# Patient Record
Sex: FEMALE | Race: WHITE | NOT HISPANIC OR LATINO | URBAN - METROPOLITAN AREA
[De-identification: names, ages, dates, MRNs, and addresses within clinical notes are randomized per-mention and may not be internally consistent; named-entity substitution may affect disease eponyms.]

---

## 2018-02-04 ENCOUNTER — EMERGENCY (EMERGENCY)
Facility: HOSPITAL | Age: 33
LOS: 1 days | Discharge: HOME | End: 2018-02-04
Admitting: EMERGENCY MEDICINE
Payer: MEDICAID

## 2018-02-04 VITALS — RESPIRATION RATE: 18 BRPM | WEIGHT: 110.01 LBS | OXYGEN SATURATION: 100 % | HEART RATE: 81 BPM

## 2018-02-04 DIAGNOSIS — F12.920 CANNABIS USE, UNSPECIFIED WITH INTOXICATION, UNCOMPLICATED: ICD-10-CM

## 2018-02-04 DIAGNOSIS — F17.200 NICOTINE DEPENDENCE, UNSPECIFIED, UNCOMPLICATED: ICD-10-CM

## 2018-02-04 PROCEDURE — 99284 EMERGENCY DEPT VISIT MOD MDM: CPT | Mod: 25

## 2018-02-04 RX ORDER — ONDANSETRON 8 MG/1
4 TABLET, FILM COATED ORAL ONCE
Qty: 0 | Refills: 0 | Status: COMPLETED | OUTPATIENT
Start: 2018-02-04 | End: 2018-02-04

## 2018-02-04 RX ORDER — ALPRAZOLAM 0.25 MG
2 TABLET ORAL ONCE
Qty: 0 | Refills: 0 | Status: DISCONTINUED | OUTPATIENT
Start: 2018-02-04 | End: 2018-02-04

## 2018-02-04 RX ADMIN — Medication 1 MILLIGRAM(S): at 22:50

## 2018-02-04 RX ADMIN — ONDANSETRON 4 MILLIGRAM(S): 8 TABLET, FILM COATED ORAL at 23:01

## 2018-02-04 NOTE — ED ADULT TRIAGE NOTE - CHIEF COMPLAINT QUOTE
Pt presents to ED with c/o anxiety and panic attack s/p eating a marijuana brownie. Pt throwing herself on the floor during triage, hx limited 2/2 patient cooperation.

## 2018-02-04 NOTE — ED PROVIDER NOTE - PHYSICAL EXAMINATION
General: lethargic, arousable to touch, moaning   Head: NCAT  Eyes: PERRL  Heart: RRR  Lungs: CTAB  Abd: soft, NTND  Neuro: moves all 4 extremities equally

## 2018-02-04 NOTE — ED PROVIDER NOTE - OBJECTIVE STATEMENT
31 y/o F    no pmhx    Pt BIBA after eating an entire marijuana brownie. Stating she feels like her heart is going to explode- throwing herself on ground in triage. Endorsing nausea. Unable to obtain rest of Denies fevers, chills, nausea, vomiting, diarrhea, constipation, abdominal pain, urinary symptoms, chest pain, palpitations, shortness of breath, dyspnea on exertion, syncope/near syncope, cough/URI symptoms, headache, weakness, numbness, focal deficits, visual changes, gait or balance changes, dizziness?HPI 2/2 intoxication. Deneis alcohol use or any other drug use.

## 2018-02-04 NOTE — ED PROVIDER NOTE - PROGRESS NOTE DETAILS
TRISTEN Smalls received patient at shift change pending sobriety patient awake, alert. feels well.   At the time of discharge from the Emergency Department, the patient is alert with fluent appropriate speech and ambulatory without difficulty. A complete medical screening examination was performed and no emergency medical condition was identified.

## 2018-02-05 VITALS
OXYGEN SATURATION: 95 % | HEART RATE: 110 BPM | DIASTOLIC BLOOD PRESSURE: 65 MMHG | SYSTOLIC BLOOD PRESSURE: 109 MMHG | TEMPERATURE: 98 F | RESPIRATION RATE: 19 BRPM

## 2020-04-29 PROBLEM — Z00.00 ENCOUNTER FOR PREVENTIVE HEALTH EXAMINATION: Status: ACTIVE | Noted: 2020-04-29

## 2020-05-18 ENCOUNTER — APPOINTMENT (OUTPATIENT)
Dept: NEUROLOGY | Facility: CLINIC | Age: 35
End: 2020-05-18

## 2022-09-29 ENCOUNTER — APPOINTMENT (OUTPATIENT)
Dept: UROGYNECOLOGY | Facility: CLINIC | Age: 37
End: 2022-09-29

## 2022-10-06 ENCOUNTER — APPOINTMENT (OUTPATIENT)
Dept: UROLOGY | Facility: CLINIC | Age: 37
End: 2022-10-06

## 2022-10-11 ENCOUNTER — APPOINTMENT (OUTPATIENT)
Dept: UROGYNECOLOGY | Facility: CLINIC | Age: 37
End: 2022-10-11

## 2023-04-27 ENCOUNTER — APPOINTMENT (OUTPATIENT)
Dept: ORTHOPEDIC SURGERY | Facility: CLINIC | Age: 38
End: 2023-04-27

## 2023-05-03 ENCOUNTER — RESULT REVIEW (OUTPATIENT)
Age: 38
End: 2023-05-03

## 2023-05-03 ENCOUNTER — OUTPATIENT (OUTPATIENT)
Dept: OUTPATIENT SERVICES | Facility: HOSPITAL | Age: 38
LOS: 1 days | End: 2023-05-03
Payer: COMMERCIAL

## 2023-05-03 ENCOUNTER — APPOINTMENT (OUTPATIENT)
Dept: ORTHOPEDIC SURGERY | Facility: CLINIC | Age: 38
End: 2023-05-03
Payer: COMMERCIAL

## 2023-05-03 VITALS
HEIGHT: 64 IN | WEIGHT: 106 LBS | OXYGEN SATURATION: 97 % | DIASTOLIC BLOOD PRESSURE: 77 MMHG | SYSTOLIC BLOOD PRESSURE: 118 MMHG | BODY MASS INDEX: 18.1 KG/M2 | HEART RATE: 73 BPM

## 2023-05-03 DIAGNOSIS — M25.811 OTHER SPECIFIED JOINT DISORDERS, RIGHT SHOULDER: ICD-10-CM

## 2023-05-03 DIAGNOSIS — Z78.9 OTHER SPECIFIED HEALTH STATUS: ICD-10-CM

## 2023-05-03 DIAGNOSIS — M79.18 MYALGIA, OTHER SITE: ICD-10-CM

## 2023-05-03 DIAGNOSIS — M54.2 CERVICALGIA: ICD-10-CM

## 2023-05-03 PROCEDURE — 99203 OFFICE O/P NEW LOW 30 MIN: CPT

## 2023-05-03 PROCEDURE — 73030 X-RAY EXAM OF SHOULDER: CPT

## 2023-05-03 PROCEDURE — 73030 X-RAY EXAM OF SHOULDER: CPT | Mod: 26,RT

## 2023-05-03 PROCEDURE — 72050 X-RAY EXAM NECK SPINE 4/5VWS: CPT

## 2023-05-03 PROCEDURE — 72050 X-RAY EXAM NECK SPINE 4/5VWS: CPT | Mod: 26

## 2023-05-03 RX ORDER — ETODOLAC 400 MG/1
400 TABLET, FILM COATED ORAL TWICE DAILY
Qty: 30 | Refills: 1 | Status: ACTIVE | COMMUNITY
Start: 2023-05-03 | End: 1900-01-01

## 2023-05-03 RX ORDER — AMITRIPTYLINE HYDROCHLORIDE 75 MG/1
TABLET, FILM COATED ORAL
Refills: 0 | Status: ACTIVE | COMMUNITY

## 2023-05-03 RX ORDER — CLONAZEPAM 1 MG/1
1 TABLET ORAL
Refills: 0 | Status: ACTIVE | COMMUNITY

## 2023-05-07 NOTE — PHYSICAL EXAM
[de-identified] : General: Well-nourished, well-developed, alert, and in no acute distress.\par Head: Normocephalic.\par Eyes: Pupils equal, extraocular muscles intact, normal sclera.\par Nose: No nasal discharge.\par Cardiovascular: Extremities are warm and well perfused. Distal pulses are symmetric bilaterally.\par Respiratory: No labored breathing.\par Extremities: Sensation is intact distally bilaterally. Distal pulses are symmetric bilaterally\par Lymphatic: No regional lymphadenopathy, no lymphedema\par Neurologic: No focal deficits\par Skin: Normal skin color, texture, and turgor\par Psychiatric: Normal affect \par \par Examination of  shoulder shows no overlying skin changes or muscular atrophy. There is tenderness to light palpation over the AC joint, Bicipital groove, Trapezius, cervical paraspinals.\par \par Range of motion shows forward elevation of 180°, abduction 180°, external rotation 60°, and internal rotation to the mid thoracic spine.  There is no pain at the end range of motion. \par C-spine full painless AROM\par \par Special Tests: \par Spurling's Compression negative\par Neer's positive\par Hawkin's positive\par Jason's negative\par Lift-Off negative\par Elliott negative\par Speed's negative  \par Apprehension Test negative\par \par Sensation is intact to light touch over the axillary, musculocutaneous, median, radial, and ulnar nerve distributions bilaterally. Capillary refill is less than two seconds. Radial pulses 2+ equal bilaterally. Strength testing shows 5/5 abduction, 5/5 external rotation, 5/5 internal rotation, 5/5 biceps, 5/5 triceps. 5/5 wrist extension, 5/5 intrinsics. \par Reflexes 2+ biceps, brachioradialis. Arenas negative. \par  [de-identified] : XR C spine (5/3/23): There is no evidence of fracture or dislocation. There is loss of lordosis. Mild intervertebral joint space narrowing. Osteophytes at C5-6.\par \par XR right shoulder (5/3/23): There is no evidence of fracture or dislocation. The glenohumeral joint space is preserved.\par

## 2023-05-07 NOTE — HISTORY OF PRESENT ILLNESS
[de-identified] : QUENTIN HEAD is a 37 year old female who presents with right shoulder pain.\par Patient is right-hand dominant.\par States the onset of pain was 1 week ago\par Friend was chest pressing a heavy weight that fell onto him. She ran over to assist and lifted the weight and felt pop in her right shoulder. No dislocation.\par Pain is around right shoulder and radiating down arm.\par There is associated paraesthesia.\par There is no associated numbness or weakness.\par Exacerbating factors are lifting arm over head and behind back\par Does not exercise regularly. 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

## 2023-05-07 NOTE — ASSESSMENT
[FreeTextEntry1] : QUENTIN HEAD is a 37 year old female with right shoulder pain.\par I discussed with the patient that their symptoms, signs, and imaging are most consistent with shoulder impingement and myofascial pain.\par We reviewed the natural history of this condition and treatment options.\par We agreed on the following plan:\par \par \par XR C spine and right shoulder taken today.\par Start Home Exercises for cervical spine conditioning. Demonstration and handout provided. \par Physical therapy. Referral provided.\par Medication: Etodolac 400mg BID prescription provided. Muscle relaxant contraindicated as patient taking clonazepam daily.\par Apply heat.\par Discussed ergonomic work space design, routine breaks with stretching, awareness of neck posture when using mobile devices, reading etc.\par Advanced imaging: consider MRI if no symptomatic improvement. \par Follow up in 6-8 weeks.

## 2023-05-07 NOTE — HISTORY OF PRESENT ILLNESS
[de-identified] : QUENTIN HEAD is a 37 year old female who presents with right shoulder pain.\par Patient is right-hand dominant.\par States the onset of pain was 1 week ago\par Friend was chest pressing a heavy weight that fell onto him. She ran over to assist and lifted the weight and felt pop in her right shoulder. No dislocation.\par Pain is around right shoulder and radiating down arm.\par There is associated paraesthesia.\par There is no associated numbness or weakness.\par Exacerbating factors are lifting arm over head and behind back\par Does not exercise regularly. 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

## 2023-05-07 NOTE — PHYSICAL EXAM
[de-identified] : General: Well-nourished, well-developed, alert, and in no acute distress.\par Head: Normocephalic.\par Eyes: Pupils equal, extraocular muscles intact, normal sclera.\par Nose: No nasal discharge.\par Cardiovascular: Extremities are warm and well perfused. Distal pulses are symmetric bilaterally.\par Respiratory: No labored breathing.\par Extremities: Sensation is intact distally bilaterally. Distal pulses are symmetric bilaterally\par Lymphatic: No regional lymphadenopathy, no lymphedema\par Neurologic: No focal deficits\par Skin: Normal skin color, texture, and turgor\par Psychiatric: Normal affect \par \par Examination of  shoulder shows no overlying skin changes or muscular atrophy. There is tenderness to light palpation over the AC joint, Bicipital groove, Trapezius, cervical paraspinals.\par \par Range of motion shows forward elevation of 180°, abduction 180°, external rotation 60°, and internal rotation to the mid thoracic spine.  There is no pain at the end range of motion. \par C-spine full painless AROM\par \par Special Tests: \par Spurling's Compression negative\par Neer's positive\par Hawkin's positive\par Jason's negative\par Lift-Off negative\par [place] negative\par Speed's negative  \par Apprehension Test negative\par \par Sensation is intact to light touch over the axillary, musculocutaneous, median, radial, and ulnar nerve distributions bilaterally. Capillary refill is less than two seconds. Radial pulses 2+ equal bilaterally. Strength testing shows 5/5 abduction, 5/5 external rotation, 5/5 internal rotation, 5/5 biceps, 5/5 triceps. 5/5 wrist extension, 5/5 intrinsics. \par Reflexes 2+ biceps, brachioradialis. Arenas negative. \par  [de-identified] : XR C spine (5/3/23): There is no evidence of fracture or dislocation. There is loss of lordosis. Mild intervertebral joint space narrowing. Osteophytes at C5-6.\par \par XR right shoulder (5/3/23): There is no evidence of fracture or dislocation. The glenohumeral joint space is preserved.\par

## 2023-05-18 ENCOUNTER — APPOINTMENT (OUTPATIENT)
Dept: PAIN MANAGEMENT | Facility: CLINIC | Age: 38
End: 2023-05-18

## 2023-12-22 ENCOUNTER — OFFICE (OUTPATIENT)
Dept: URBAN - METROPOLITAN AREA CLINIC 92 | Facility: CLINIC | Age: 38
Setting detail: OPHTHALMOLOGY
End: 2023-12-22
Payer: MEDICAID

## 2023-12-22 DIAGNOSIS — H01.004: ICD-10-CM

## 2023-12-22 DIAGNOSIS — H01.001: ICD-10-CM

## 2023-12-22 DIAGNOSIS — B30.9: ICD-10-CM

## 2023-12-22 PROCEDURE — 92012 INTRM OPH EXAM EST PATIENT: CPT | Performed by: SPECIALIST

## 2023-12-22 ASSESSMENT — LID EXAM ASSESSMENTS
OS_MEIBOMITIS: LLL LUL 1+ 2+
OD_MEIBOMITIS: RLL RUL 1+ 2+
OS_BLEPHARITIS: 1+ 2+
OD_BLEPHARITIS: 1+ 2+

## 2023-12-22 ASSESSMENT — REFRACTION_MANIFEST
OD_CYLINDER: +0.25
OD_CYLINDER: -0.50
OS_VA1: 20/25
OS_AXIS: 010
OS_CYLINDER: -0.50
OU_VA: 20/20-1
OS_VA1: 20/20-2
OD_SPHERE: PLANO
OS_CYLINDER: -0.50
OS_SPHERE: -2.75
OD_VA1: 20/20
OD_AXIS: 143
OD_AXIS: 175
OD_SPHERE: -3.50
OD_CYLINDER: -0.50
OD_SPHERE: -3.25
OD_VA1: 20/20-
OD_AXIS: 158
OD_VA1: 20/20
OS_SPHERE: PLANO
OS_SPHERE: -3.00
OS_CYLINDER: -0.50
OS_VA1: 20/20
OS_AXIS: 14
OS_AXIS: 015

## 2023-12-22 ASSESSMENT — SPHEQUIV_DERIVED
OD_SPHEQUIV: 0.375
OS_SPHEQUIV: -3
OS_SPHEQUIV: -3.25
OD_SPHEQUIV: -3.5
OS_SPHEQUIV: 0
OD_SPHEQUIV: -3.75

## 2023-12-22 ASSESSMENT — SUPERFICIAL PUNCTATE KERATITIS (SPK)
OD_SPK: 1+ 2+
OS_SPK: 2+

## 2023-12-22 ASSESSMENT — REFRACTION_CURRENTRX
OD_VPRISM_DIRECTION: SV
OD_OVR_VA: 20/
OD_SPHERE: -3.75
OS_VPRISM_DIRECTION: SV
OD_CYLINDER: SPH
OD_AXIS: 0
OS_SPHERE: -3.00
OS_CYLINDER: -0.25
OS_AXIS: 116
OS_OVR_VA: 20/

## 2023-12-22 ASSESSMENT — REFRACTION_AUTOREFRACTION
OD_AXIS: 31
OD_CYLINDER: +0.25
OD_SPHERE: +0.25
OS_SPHERE: -0.25
OS_CYLINDER: +0.50
OS_AXIS: 99

## 2023-12-22 ASSESSMENT — TEAR BREAK UP TIME (TBUT)
OS_TBUT: NORMAL
OD_TBUT: NORMAL

## 2024-04-03 ENCOUNTER — OFFICE (OUTPATIENT)
Dept: URBAN - METROPOLITAN AREA CLINIC 92 | Facility: CLINIC | Age: 39
Setting detail: OPHTHALMOLOGY
End: 2024-04-03
Payer: MEDICAID

## 2024-04-03 DIAGNOSIS — H01.001: ICD-10-CM

## 2024-04-03 DIAGNOSIS — H01.004: ICD-10-CM

## 2024-04-03 PROBLEM — H10.45 ALLERGIC CONJUNCTIVITIS: Status: ACTIVE | Noted: 2024-04-03

## 2024-04-03 PROCEDURE — 99213 OFFICE O/P EST LOW 20 MIN: CPT | Performed by: OPHTHALMOLOGY

## 2024-07-16 ENCOUNTER — OFFICE (OUTPATIENT)
Dept: URBAN - METROPOLITAN AREA CLINIC 92 | Facility: CLINIC | Age: 39
Setting detail: OPHTHALMOLOGY
End: 2024-07-16
Payer: MEDICAID

## 2024-07-16 DIAGNOSIS — H10.45: ICD-10-CM

## 2024-07-16 PROCEDURE — 99213 OFFICE O/P EST LOW 20 MIN: CPT | Performed by: OPHTHALMOLOGY

## 2024-07-16 ASSESSMENT — LID EXAM ASSESSMENTS
OS_BLEPHARITIS: 1+
OD_BLEPHARITIS: 1+
OD_MEIBOMITIS: RLL RUL 1+
OS_MEIBOMITIS: LLL LUL 1+

## 2024-07-16 ASSESSMENT — CONFRONTATIONAL VISUAL FIELD TEST (CVF)
OD_FINDINGS: FULL
OS_FINDINGS: FULL

## 2024-07-30 ENCOUNTER — RX ONLY (RX ONLY)
Age: 39
End: 2024-07-30

## 2024-07-30 ENCOUNTER — OFFICE (OUTPATIENT)
Dept: URBAN - METROPOLITAN AREA CLINIC 92 | Facility: CLINIC | Age: 39
Setting detail: OPHTHALMOLOGY
End: 2024-07-30
Payer: MEDICAID

## 2024-07-30 DIAGNOSIS — H01.001: ICD-10-CM

## 2024-07-30 DIAGNOSIS — H01.004: ICD-10-CM

## 2024-07-30 DIAGNOSIS — H10.45: ICD-10-CM

## 2024-07-30 DIAGNOSIS — H52.13: ICD-10-CM

## 2024-07-30 DIAGNOSIS — H16.223: ICD-10-CM

## 2024-07-30 PROCEDURE — 99213 OFFICE O/P EST LOW 20 MIN: CPT | Performed by: SPECIALIST

## 2024-07-30 ASSESSMENT — LID EXAM ASSESSMENTS
OD_MEIBOMITIS: RLL RUL 1+
OS_BLEPHARITIS: 1+
OD_BLEPHARITIS: 1+
OS_MEIBOMITIS: LLL LUL 1+

## 2024-10-02 ENCOUNTER — OFFICE (OUTPATIENT)
Dept: URBAN - METROPOLITAN AREA CLINIC 92 | Facility: CLINIC | Age: 39
Setting detail: OPHTHALMOLOGY
End: 2024-10-02
Payer: COMMERCIAL

## 2024-10-02 ENCOUNTER — RX ONLY (RX ONLY)
Age: 39
End: 2024-10-02

## 2024-10-02 DIAGNOSIS — B30.9: ICD-10-CM

## 2024-10-02 PROCEDURE — 99213 OFFICE O/P EST LOW 20 MIN: CPT | Performed by: OPHTHALMOLOGY

## 2024-10-02 ASSESSMENT — KERATOMETRY
OD_K1POWER_DIOPTERS: 42.50
OS_K1POWER_DIOPTERS: 42.75
OD_AXISANGLE_DEGREES: 90
OS_K2POWER_DIOPTERS: 43.25
METHOD_AUTO_MANUAL: AUTO
OD_K2POWER_DIOPTERS: 42.50
OS_AXISANGLE_DEGREES: 88

## 2024-10-02 ASSESSMENT — REFRACTION_CURRENTRX
OS_SPHERE: -3.00
OS_VPRISM_DIRECTION: SV
OD_AXIS: 0
OD_VPRISM_DIRECTION: SV
OD_SPHERE: -3.75
OD_OVR_VA: 20/
OD_CYLINDER: SPH
OS_AXIS: 116
OS_CYLINDER: -0.25
OS_OVR_VA: 20/

## 2024-10-02 ASSESSMENT — REFRACTION_MANIFEST
OS_CYLINDER: -0.50
OD_VA1: 20/20
OS_SPHERE: PLANO
OD_SPHERE: -3.50
OS_AXIS: 010
OD_CYLINDER: -0.50
OS_AXIS: 015
OD_VA1: 20/20
OS_SPHERE: -3.00
OD_CYLINDER: -0.50
OS_SPHERE: -2.75
OU_VA: 20/20-1
OS_AXIS: 14
OS_VA1: 20/25
OD_SPHERE: -3.25
OD_CYLINDER: +0.25
OD_AXIS: 158
OD_SPHERE: PLANO
OD_AXIS: 175
OD_AXIS: 143
OS_CYLINDER: -0.50
OS_CYLINDER: -0.50
OD_VA1: 20/20-
OS_VA1: 20/20-2
OS_VA1: 20/20

## 2024-10-02 ASSESSMENT — REFRACTION_AUTOREFRACTION
OD_CYLINDER: +0.25
OS_SPHERE: -0.25
OS_CYLINDER: +0.50
OD_AXIS: 31
OS_AXIS: 99
OD_SPHERE: +0.25

## 2024-10-02 ASSESSMENT — CONFRONTATIONAL VISUAL FIELD TEST (CVF)
OS_FINDINGS: FULL
OD_FINDINGS: FULL

## 2024-10-02 ASSESSMENT — PACHYMETRY
OD_CT_UM: 527
OD_CT_CORRECTION: 1

## 2024-10-02 ASSESSMENT — LID EXAM ASSESSMENTS
OS_MEIBOMITIS: LLL LUL 1+
OS_BLEPHARITIS: 1+
OD_BLEPHARITIS: 1+
OD_MEIBOMITIS: RLL RUL 1+

## 2024-10-02 ASSESSMENT — VISUAL ACUITY
OD_BCVA: 20/25
OS_BCVA: 20/20

## 2024-10-02 ASSESSMENT — TONOMETRY: OD_IOP_MMHG: 12

## 2024-10-02 ASSESSMENT — TEAR BREAK UP TIME (TBUT)
OS_TBUT: NORMAL
OD_TBUT: NORMAL

## 2024-10-11 ENCOUNTER — RX ONLY (RX ONLY)
Age: 39
End: 2024-10-11

## 2024-10-11 ENCOUNTER — OFFICE (OUTPATIENT)
Dept: URBAN - METROPOLITAN AREA CLINIC 92 | Facility: CLINIC | Age: 39
Setting detail: OPHTHALMOLOGY
End: 2024-10-11
Payer: COMMERCIAL

## 2024-10-11 DIAGNOSIS — B30.9: ICD-10-CM

## 2024-10-11 PROCEDURE — 99213 OFFICE O/P EST LOW 20 MIN: CPT | Performed by: SPECIALIST

## 2024-10-11 ASSESSMENT — TEAR BREAK UP TIME (TBUT)
OD_TBUT: NORMAL
OS_TBUT: NORMAL

## 2024-10-11 ASSESSMENT — REFRACTION_MANIFEST
OD_SPHERE: -3.25
OD_AXIS: 158
OD_CYLINDER: -0.50
OS_AXIS: 14
OS_AXIS: 015
OD_SPHERE: PLANO
OS_SPHERE: -3.00
OS_SPHERE: -2.75
OS_CYLINDER: -0.50
OS_VA1: 20/25
OD_VA1: 20/20
OD_CYLINDER: -0.50
OD_VA1: 20/20
OS_CYLINDER: -0.50
OS_VA1: 20/20
OU_VA: 20/20-1
OD_VA1: 20/20-
OS_VA1: 20/20-2
OS_CYLINDER: -0.50
OS_AXIS: 010
OD_AXIS: 143
OD_AXIS: 175
OD_CYLINDER: +0.25
OS_SPHERE: PLANO
OD_SPHERE: -3.50

## 2024-10-11 ASSESSMENT — CONFRONTATIONAL VISUAL FIELD TEST (CVF)
OD_FINDINGS: FULL
OS_FINDINGS: FULL

## 2024-10-11 ASSESSMENT — LID EXAM ASSESSMENTS
OS_BLEPHARITIS: 1+
OD_MEIBOMITIS: RLL RUL 1+
OS_MEIBOMITIS: LLL LUL 1+
OD_BLEPHARITIS: 1+

## 2024-10-11 ASSESSMENT — REFRACTION_AUTOREFRACTION
OS_CYLINDER: +0.50
OD_AXIS: 31
OD_SPHERE: +0.25
OD_CYLINDER: +0.25
OS_SPHERE: -0.25
OS_AXIS: 99

## 2024-10-11 ASSESSMENT — KERATOMETRY
METHOD_AUTO_MANUAL: AUTO
OS_AXISANGLE_DEGREES: 88
OD_K2POWER_DIOPTERS: 42.50
OD_K1POWER_DIOPTERS: 42.50
OS_K2POWER_DIOPTERS: 43.25
OS_K1POWER_DIOPTERS: 42.75
OD_AXISANGLE_DEGREES: 90

## 2024-10-11 ASSESSMENT — REFRACTION_CURRENTRX
OD_CYLINDER: SPH
OD_SPHERE: -3.75
OD_OVR_VA: 20/
OD_VPRISM_DIRECTION: SV
OS_SPHERE: -3.00
OS_CYLINDER: -0.25
OS_VPRISM_DIRECTION: SV
OD_AXIS: 0
OS_AXIS: 116
OS_OVR_VA: 20/

## 2024-10-11 ASSESSMENT — VISUAL ACUITY
OS_BCVA: 20/20
OD_BCVA: 20/25

## 2025-06-18 ENCOUNTER — NON-APPOINTMENT (OUTPATIENT)
Age: 40
End: 2025-06-18

## 2025-06-26 ENCOUNTER — APPOINTMENT (OUTPATIENT)
Facility: CLINIC | Age: 40
End: 2025-06-26
Payer: COMMERCIAL

## 2025-06-26 ENCOUNTER — NON-APPOINTMENT (OUTPATIENT)
Age: 40
End: 2025-06-26

## 2025-06-26 VITALS
OXYGEN SATURATION: 98 % | BODY MASS INDEX: 19.12 KG/M2 | HEART RATE: 97 BPM | SYSTOLIC BLOOD PRESSURE: 108 MMHG | DIASTOLIC BLOOD PRESSURE: 47 MMHG | HEIGHT: 64 IN | WEIGHT: 112 LBS | TEMPERATURE: 97.6 F

## 2025-06-26 PROCEDURE — 99204 OFFICE O/P NEW MOD 45 MIN: CPT

## 2025-06-26 PROCEDURE — G2211 COMPLEX E/M VISIT ADD ON: CPT

## 2025-06-26 RX ORDER — PANTOPRAZOLE 40 MG/1
40 TABLET, DELAYED RELEASE ORAL DAILY
Qty: 30 | Refills: 3 | Status: ACTIVE | COMMUNITY
Start: 2025-06-26 | End: 1900-01-01

## 2025-06-26 RX ORDER — AZELASTINE 137 UG/1
137 SPRAY, METERED NASAL TWICE DAILY
Qty: 1 | Refills: 6 | Status: ACTIVE | COMMUNITY
Start: 2025-06-26 | End: 1900-01-01

## 2025-06-27 PROBLEM — K21.9 CHRONIC GERD: Status: ACTIVE | Noted: 2025-06-27

## 2025-06-27 PROBLEM — R05.3 CHRONIC COUGH: Status: ACTIVE | Noted: 2025-06-26

## 2025-07-10 ENCOUNTER — APPOINTMENT (OUTPATIENT)
Dept: OTOLARYNGOLOGY | Facility: CLINIC | Age: 40
End: 2025-07-10
Payer: COMMERCIAL

## 2025-07-10 VITALS
SYSTOLIC BLOOD PRESSURE: 136 MMHG | HEIGHT: 64 IN | HEART RATE: 85 BPM | BODY MASS INDEX: 19.12 KG/M2 | WEIGHT: 112 LBS | DIASTOLIC BLOOD PRESSURE: 87 MMHG | OXYGEN SATURATION: 99 %

## 2025-07-10 PROCEDURE — 99204 OFFICE O/P NEW MOD 45 MIN: CPT | Mod: 25

## 2025-07-10 PROCEDURE — 31231 NASAL ENDOSCOPY DX: CPT

## 2025-07-29 ENCOUNTER — OUTPATIENT (OUTPATIENT)
Dept: OUTPATIENT SERVICES | Facility: HOSPITAL | Age: 40
LOS: 1 days | End: 2025-07-29

## 2025-07-29 VITALS
SYSTOLIC BLOOD PRESSURE: 112 MMHG | WEIGHT: 111.99 LBS | RESPIRATION RATE: 16 BRPM | HEART RATE: 83 BPM | TEMPERATURE: 98 F | OXYGEN SATURATION: 99 % | DIASTOLIC BLOOD PRESSURE: 72 MMHG | HEIGHT: 65.5 IN

## 2025-07-29 DIAGNOSIS — R09.82 POSTNASAL DRIP: ICD-10-CM

## 2025-07-29 DIAGNOSIS — Z98.890 OTHER SPECIFIED POSTPROCEDURAL STATES: Chronic | ICD-10-CM

## 2025-07-29 DIAGNOSIS — R93.0 ABNORMAL FINDINGS ON DIAGNOSTIC IMAGING OF SKULL AND HEAD, NOT ELSEWHERE CLASSIFIED: ICD-10-CM

## 2025-07-29 RX ORDER — SODIUM CHLORIDE 9 G/1000ML
1000 INJECTION, SOLUTION INTRAVENOUS
Refills: 0 | Status: DISCONTINUED | OUTPATIENT
Start: 2025-08-11 | End: 2025-08-25

## 2025-08-11 ENCOUNTER — TRANSCRIPTION ENCOUNTER (OUTPATIENT)
Age: 40
End: 2025-08-11

## 2025-08-11 ENCOUNTER — APPOINTMENT (OUTPATIENT)
Dept: OTOLARYNGOLOGY | Facility: HOSPITAL | Age: 40
End: 2025-08-11

## 2025-08-11 ENCOUNTER — OUTPATIENT (OUTPATIENT)
Dept: INPATIENT UNIT | Facility: HOSPITAL | Age: 40
LOS: 1 days | End: 2025-08-11
Payer: COMMERCIAL

## 2025-08-11 VITALS
TEMPERATURE: 98 F | HEART RATE: 91 BPM | RESPIRATION RATE: 16 BRPM | DIASTOLIC BLOOD PRESSURE: 70 MMHG | SYSTOLIC BLOOD PRESSURE: 111 MMHG | OXYGEN SATURATION: 100 %

## 2025-08-11 VITALS
HEART RATE: 93 BPM | SYSTOLIC BLOOD PRESSURE: 109 MMHG | OXYGEN SATURATION: 100 % | DIASTOLIC BLOOD PRESSURE: 70 MMHG | RESPIRATION RATE: 19 BRPM | TEMPERATURE: 98 F

## 2025-08-11 DIAGNOSIS — R93.0 ABNORMAL FINDINGS ON DIAGNOSTIC IMAGING OF SKULL AND HEAD, NOT ELSEWHERE CLASSIFIED: ICD-10-CM

## 2025-08-11 DIAGNOSIS — Z98.890 OTHER SPECIFIED POSTPROCEDURAL STATES: Chronic | ICD-10-CM

## 2025-08-11 LAB — HCG UR QL: NEGATIVE — SIGNIFICANT CHANGE UP

## 2025-08-11 PROCEDURE — 61782 SCAN PROC CRANIAL EXTRA: CPT

## 2025-08-11 PROCEDURE — 31288 NASAL/SINUS ENDOSCOPY SURG: CPT | Mod: RT

## 2025-08-11 DEVICE — SURGIFLO MATRIX WITH THROMBIN KIT
Type: IMPLANTABLE DEVICE | Site: BILATERAL | Status: NON-FUNCTIONAL
Removed: 2025-08-11

## 2025-08-11 DEVICE — SPLINT INTRANASAL POSISEP X 0.6X2IN
Type: IMPLANTABLE DEVICE | Site: BILATERAL | Status: NON-FUNCTIONAL
Removed: 2025-08-11

## 2025-08-11 RX ORDER — OXYCODONE 5 MG/1
5 TABLET ORAL
Qty: 12 | Refills: 0 | Status: ACTIVE | COMMUNITY
Start: 2025-08-11 | End: 1900-01-01

## 2025-08-11 RX ORDER — ACETAMINOPHEN 500 MG/5ML
2 LIQUID (ML) ORAL
Qty: 0 | Refills: 0 | DISCHARGE
Start: 2025-08-11

## 2025-08-11 RX ORDER — OXYCODONE HYDROCHLORIDE 30 MG/1
1 TABLET ORAL
Qty: 10 | Refills: 0
Start: 2025-08-11

## 2025-08-11 RX ORDER — OXYCODONE HYDROCHLORIDE 30 MG/1
5 TABLET ORAL ONCE
Refills: 0 | Status: DISCONTINUED | OUTPATIENT
Start: 2025-08-11 | End: 2025-08-11

## 2025-08-11 RX ORDER — METOCLOPRAMIDE HCL 10 MG
10 TABLET ORAL ONCE
Refills: 0 | Status: COMPLETED | OUTPATIENT
Start: 2025-08-11 | End: 2025-08-11

## 2025-08-11 RX ORDER — ONDANSETRON HCL/PF 4 MG/2 ML
4 VIAL (ML) INJECTION ONCE
Refills: 0 | Status: COMPLETED | OUTPATIENT
Start: 2025-08-11 | End: 2025-08-11

## 2025-08-11 RX ORDER — FENTANYL CITRATE-0.9 % NACL/PF 100MCG/2ML
50 SYRINGE (ML) INTRAVENOUS
Refills: 0 | Status: DISCONTINUED | OUTPATIENT
Start: 2025-08-11 | End: 2025-08-11

## 2025-08-11 RX ORDER — DROSPIRENONE AND ETHINYL ESTRADIOL 0.03MG-3MG
1 KIT ORAL
Refills: 0 | DISCHARGE

## 2025-08-11 RX ORDER — ACETAMINOPHEN 500 MG/5ML
650 LIQUID (ML) ORAL EVERY 6 HOURS
Refills: 0 | Status: DISCONTINUED | OUTPATIENT
Start: 2025-08-11 | End: 2025-08-25

## 2025-08-11 RX ORDER — AMITRIPTYLINE HYDROCHLORIDE 25 MG/1
1 TABLET, FILM COATED ORAL
Refills: 0 | DISCHARGE

## 2025-08-11 RX ORDER — FLUTICASONE PROPIONATE 50 UG/1
1 SPRAY, METERED NASAL
Refills: 0 | DISCHARGE

## 2025-08-11 RX ORDER — CEPHALEXIN 250 MG/1
1 CAPSULE ORAL
Qty: 14 | Refills: 0
Start: 2025-08-11 | End: 2025-08-17

## 2025-08-11 RX ORDER — SODIUM CHLORIDE 0.65 %
2 AEROSOL, SPRAY (ML) NASAL
Qty: 1 | Refills: 0
Start: 2025-08-11

## 2025-08-11 RX ORDER — DOXYCYCLINE HYCLATE 100 MG/1
100 TABLET, COATED ORAL
Qty: 14 | Refills: 0 | Status: ACTIVE | COMMUNITY
Start: 2025-08-11 | End: 1900-01-01

## 2025-08-11 RX ORDER — CLONAZEPAM 0.5 MG/1
1 TABLET ORAL
Refills: 0 | DISCHARGE

## 2025-08-11 RX ADMIN — Medication 4 MILLIGRAM(S): at 11:35

## 2025-08-11 RX ADMIN — Medication 20 MILLIGRAM(S): at 13:39

## 2025-08-11 RX ADMIN — Medication 50 MICROGRAM(S): at 12:20

## 2025-08-11 RX ADMIN — Medication 50 MICROGRAM(S): at 12:26

## 2025-08-11 RX ADMIN — OXYCODONE HYDROCHLORIDE 5 MILLIGRAM(S): 30 TABLET ORAL at 14:17

## 2025-08-11 RX ADMIN — Medication 10 MILLIGRAM(S): at 13:39

## 2025-08-11 RX ADMIN — Medication 50 MICROGRAM(S): at 12:01

## 2025-08-11 RX ADMIN — Medication 50 MICROGRAM(S): at 12:45

## 2025-08-11 RX ADMIN — OXYCODONE HYDROCHLORIDE 5 MILLIGRAM(S): 30 TABLET ORAL at 14:59

## 2025-08-12 ENCOUNTER — NON-APPOINTMENT (OUTPATIENT)
Age: 40
End: 2025-08-12

## 2025-08-21 ENCOUNTER — APPOINTMENT (OUTPATIENT)
Dept: OTOLARYNGOLOGY | Facility: CLINIC | Age: 40
End: 2025-08-21
Payer: COMMERCIAL

## 2025-08-21 VITALS
HEIGHT: 63 IN | DIASTOLIC BLOOD PRESSURE: 84 MMHG | BODY MASS INDEX: 20.38 KG/M2 | HEART RATE: 105 BPM | SYSTOLIC BLOOD PRESSURE: 122 MMHG | WEIGHT: 115 LBS | OXYGEN SATURATION: 100 %

## 2025-08-21 DIAGNOSIS — R93.0 ABNORMAL FINDINGS ON DIAGNOSTIC IMAGING OF SKULL AND HEAD, NOT ELSEWHERE CLASSIFIED: ICD-10-CM

## 2025-08-21 DIAGNOSIS — R09.82 POSTNASAL DRIP: ICD-10-CM

## 2025-08-21 PROCEDURE — 99024 POSTOP FOLLOW-UP VISIT: CPT

## 2025-08-21 PROCEDURE — 31237 NSL/SINS NDSC SURG BX POLYPC: CPT | Mod: 50

## 2025-08-21 RX ORDER — METHYLPREDNISOLONE 4 MG/1
4 TABLET ORAL
Qty: 1 | Refills: 0 | Status: ACTIVE | COMMUNITY
Start: 2025-08-21 | End: 1900-01-01

## 2025-08-28 PROBLEM — G43.909 MIGRAINE, UNSPECIFIED, NOT INTRACTABLE, WITHOUT STATUS MIGRAINOSUS: Chronic | Status: ACTIVE | Noted: 2025-07-29

## 2025-08-28 PROBLEM — M79.7 FIBROMYALGIA: Chronic | Status: ACTIVE | Noted: 2025-07-29

## 2025-08-28 PROBLEM — R05.3 CHRONIC COUGH: Chronic | Status: ACTIVE | Noted: 2025-07-29

## 2025-08-28 PROBLEM — R09.82 POSTNASAL DRIP: Chronic | Status: ACTIVE | Noted: 2025-07-29

## 2025-09-04 ENCOUNTER — APPOINTMENT (OUTPATIENT)
Dept: OTOLARYNGOLOGY | Facility: CLINIC | Age: 40
End: 2025-09-04
Payer: COMMERCIAL

## 2025-09-04 VITALS
DIASTOLIC BLOOD PRESSURE: 85 MMHG | HEIGHT: 63 IN | OXYGEN SATURATION: 97 % | BODY MASS INDEX: 20.38 KG/M2 | HEART RATE: 88 BPM | SYSTOLIC BLOOD PRESSURE: 123 MMHG | WEIGHT: 115 LBS

## 2025-09-04 DIAGNOSIS — R09.82 POSTNASAL DRIP: ICD-10-CM

## 2025-09-04 DIAGNOSIS — R93.0 ABNORMAL FINDINGS ON DIAGNOSTIC IMAGING OF SKULL AND HEAD, NOT ELSEWHERE CLASSIFIED: ICD-10-CM

## 2025-09-04 DIAGNOSIS — R51.9 HEADACHE, UNSPECIFIED: ICD-10-CM

## 2025-09-04 PROCEDURE — 31237 NSL/SINS NDSC SURG BX POLYPC: CPT | Mod: RT

## 2025-09-04 PROCEDURE — 99024 POSTOP FOLLOW-UP VISIT: CPT

## 2025-09-04 RX ORDER — MOMETASONE FUROATE 100 %
POWDER (GRAM) MISCELLANEOUS
Qty: 1 | Refills: 3 | Status: ACTIVE | COMMUNITY
Start: 2025-09-04 | End: 1900-01-01

## 2025-09-04 RX ORDER — DOXYCYCLINE 100 MG/1
100 CAPSULE ORAL
Qty: 20 | Refills: 0 | Status: ACTIVE | COMMUNITY
Start: 2025-09-04 | End: 1900-01-01

## 2025-09-04 RX ORDER — RIMEGEPANT SULFATE 75 MG/75MG
75 TABLET, ORALLY DISINTEGRATING ORAL
Qty: 6 | Refills: 0 | Status: ACTIVE | COMMUNITY
Start: 2025-09-04 | End: 1900-01-01

## 2025-09-05 RX ORDER — KETOROLAC TROMETHAMINE 10 MG/1
10 TABLET, FILM COATED ORAL 3 TIMES DAILY
Qty: 30 | Refills: 0 | Status: ACTIVE | COMMUNITY
Start: 2025-09-05 | End: 1900-01-01

## 2025-09-08 ENCOUNTER — APPOINTMENT (OUTPATIENT)
Dept: MRI IMAGING | Facility: CLINIC | Age: 40
End: 2025-09-08
Payer: COMMERCIAL

## 2025-09-08 PROCEDURE — 70543 MRI ORBT/FAC/NCK W/O &W/DYE: CPT | Mod: 26

## (undated) DEVICE — LIJ-MEDTRONIC FUSION ENT NAVIGATION SET: Type: DURABLE MEDICAL EQUIPMENT

## (undated) DEVICE — TUBING SUCTION NONCONDUCTIVE 6MM X 12FT

## (undated) DEVICE — Device

## (undated) DEVICE — CATH IV SAFE INSYTE 14G X 1.75" (ORANGE)

## (undated) DEVICE — CLEANING SHEATH ENDO-SCRUB FOR STORZ 7210AA TELESCOPE 4MM 0 DEGREE

## (undated) DEVICE — LABELS BLANK W PEN

## (undated) DEVICE — WARMING BLANKET FULL UNDERBODY

## (undated) DEVICE — SOL IRR POUR NS 0.9% 500ML

## (undated) DEVICE — SUT PLAIN GUT 4-0 18" SC-1

## (undated) DEVICE — BLADE MEDTRONIC ENT FUSION TRICUT ROTATABLE STRAIGHT 4MM X 13CM

## (undated) DEVICE — POSITIONER FOAM EGG CRATE ULNAR 2PCS (PINK)

## (undated) DEVICE — NDL HYPO REGULAR BEVEL 25G X 1.5" (BLUE)

## (undated) DEVICE — ENDO SCRUB

## (undated) DEVICE — VENODYNE/SCD SLEEVE CALF MEDIUM

## (undated) DEVICE — CANISTER DISPOSABLE THIN WALL 3000CC

## (undated) DEVICE — SUT ETHILON 3-0 30" FS-1

## (undated) DEVICE — PACK SMR

## (undated) DEVICE — DRSG TELFA 3 X 8

## (undated) DEVICE — MEDTRONIC AXIEM PATIENT TRACKER NON-INVASIVE

## (undated) DEVICE — PAD MEDTRONIC ENT ADHESIVE PAD

## (undated) DEVICE — MEDTRONIC INSTRUMENT TRACKER ENT

## (undated) DEVICE — POSITIONER STRAP ARMBOARD VELCRO TS-30

## (undated) DEVICE — BLADE MEDTRONIC ENT TRICUT ROTATABLE STRAIGHT 4MM X 11CM

## (undated) DEVICE — ELCTR COAGULATOR HANDSWITCHING 10FR

## (undated) DEVICE — SYR LUER LOK 5CC

## (undated) DEVICE — STRYKER MALLEABLE SUCTION MEDIUM STANDARD

## (undated) DEVICE — SUT CHROMIC 4-0 18" G-2

## (undated) DEVICE — LUBRICATING JELLY ONESHOT 1.25OZ

## (undated) DEVICE — DRSG SPLINT INTRA NASAL .5MM STANDARD THICK

## (undated) DEVICE — SYR CONTROL LUER LOK 10CC

## (undated) DEVICE — SOL ANTI FOG (FRED)

## (undated) DEVICE — DRSG SPLINT INTRA NASAL .5MM OVERSIZE THICK

## (undated) DEVICE — DRSG NASOPORE 4CM FIRM

## (undated) DEVICE — TUBING IRRIGATION STRAIGHT SHOT

## (undated) DEVICE — ELCTR BOVIE SUCTION 8FR 6"